# Patient Record
Sex: FEMALE
[De-identification: names, ages, dates, MRNs, and addresses within clinical notes are randomized per-mention and may not be internally consistent; named-entity substitution may affect disease eponyms.]

---

## 2023-04-11 PROBLEM — Z00.00 ENCOUNTER FOR PREVENTIVE HEALTH EXAMINATION: Status: ACTIVE | Noted: 2023-04-11

## 2023-04-12 ENCOUNTER — APPOINTMENT (OUTPATIENT)
Dept: OTOLARYNGOLOGY | Facility: CLINIC | Age: 78
End: 2023-04-12
Payer: MEDICARE

## 2023-04-12 VITALS
SYSTOLIC BLOOD PRESSURE: 132 MMHG | HEIGHT: 56 IN | BODY MASS INDEX: 38.69 KG/M2 | HEART RATE: 99 BPM | WEIGHT: 172 LBS | DIASTOLIC BLOOD PRESSURE: 103 MMHG

## 2023-04-12 DIAGNOSIS — J31.0 CHRONIC RHINITIS: ICD-10-CM

## 2023-04-12 DIAGNOSIS — J34.2 DEVIATED NASAL SEPTUM: ICD-10-CM

## 2023-04-12 DIAGNOSIS — R04.0 EPISTAXIS: ICD-10-CM

## 2023-04-12 DIAGNOSIS — H69.80 OTHER SPECIFIED DISORDERS OF EUSTACHIAN TUBE, UNSPECIFIED EAR: ICD-10-CM

## 2023-04-12 PROCEDURE — 99204 OFFICE O/P NEW MOD 45 MIN: CPT

## 2023-04-12 PROCEDURE — 92567 TYMPANOMETRY: CPT

## 2023-04-12 PROCEDURE — 92557 COMPREHENSIVE HEARING TEST: CPT

## 2023-04-12 NOTE — PHYSICAL EXAM
[] : septum deviated to the right [Normal] : mucosa is normal [Midline] : trachea located in midline position [de-identified] : SCARRED TM ON THE RIGHT WITH IRRITATION/ RETRACTION [de-identified] : MILD MUCOSAL IRRITATION

## 2023-04-12 NOTE — REVIEW OF SYSTEMS
[Sneezing] : sneezing [Ear Pain] : ear pain [Ear Itch] : ear itch [Dizziness] : dizziness [Vertigo] : vertigo [Ear Noises] : ear noises [Nasal Congestion] : nasal congestion [Sense Of Smell Problem] : sense of smell problem [Snoring With Pauses] : snoring with pauses [Throat Pain] : throat pain [Throat Dryness] : throat dryness [Throat Itching] : throat itching [Shortness Of Breath] : shortness of breath [Itching] : itching [Negative] : Heme/Lymph [Patient Intake Form Reviewed] : Patient intake form was reviewed [FreeTextEntry1] : headaches, noisy breathing, fatigue, daytime sleepiness, feel cooler than other, muscle aches

## 2023-04-12 NOTE — HISTORY OF PRESENT ILLNESS
[de-identified] : LEFT NASAL BLEED SMALL AMOUNT FOR A FEW DAYS\par NOT NEW\par ASA 81\par MEDICAL HX REVIEWED\par HEARING LOSS

## 2023-04-12 NOTE — HISTORY OF PRESENT ILLNESS
[de-identified] : LEFT NASAL BLEED SMALL AMOUNT FOR A FEW DAYS\par NOT NEW\par ASA 81\par MEDICAL HX REVIEWED\par HEARING LOSS

## 2023-04-12 NOTE — PHYSICAL EXAM
[] : septum deviated to the right [Normal] : mucosa is normal [Midline] : trachea located in midline position [de-identified] : SCARRED TM ON THE RIGHT WITH IRRITATION/ RETRACTION [de-identified] : MILD MUCOSAL IRRITATION

## 2023-10-11 ENCOUNTER — RX RENEWAL (OUTPATIENT)
Age: 78
End: 2023-10-11

## 2023-10-11 RX ORDER — MUPIROCIN 20 MG/G
2 OINTMENT TOPICAL
Qty: 22 | Refills: 0 | Status: ACTIVE | COMMUNITY
Start: 2023-04-12 | End: 1900-01-01

## 2023-10-30 ENCOUNTER — RX RENEWAL (OUTPATIENT)
Age: 78
End: 2023-10-30